# Patient Record
Sex: FEMALE | Race: ASIAN | NOT HISPANIC OR LATINO | URBAN - METROPOLITAN AREA
[De-identification: names, ages, dates, MRNs, and addresses within clinical notes are randomized per-mention and may not be internally consistent; named-entity substitution may affect disease eponyms.]

---

## 2018-08-01 ENCOUNTER — EMERGENCY (EMERGENCY)
Facility: HOSPITAL | Age: 53
LOS: 1 days | Discharge: HOME | End: 2018-08-01
Attending: EMERGENCY MEDICINE | Admitting: EMERGENCY MEDICINE
Payer: MEDICAID

## 2018-08-01 VITALS
SYSTOLIC BLOOD PRESSURE: 145 MMHG | OXYGEN SATURATION: 99 % | RESPIRATION RATE: 18 BRPM | DIASTOLIC BLOOD PRESSURE: 89 MMHG | HEART RATE: 81 BPM | TEMPERATURE: 98 F

## 2018-08-01 LAB
ALBUMIN SERPL ELPH-MCNC: 3.9 G/DL — SIGNIFICANT CHANGE UP (ref 3.3–5)
ALP SERPL-CCNC: 118 U/L — SIGNIFICANT CHANGE UP (ref 40–120)
ALT FLD-CCNC: 45 U/L — HIGH (ref 4–33)
AST SERPL-CCNC: 36 U/L — HIGH (ref 4–32)
BASOPHILS # BLD AUTO: 0.02 K/UL — SIGNIFICANT CHANGE UP (ref 0–0.2)
BASOPHILS NFR BLD AUTO: 0.2 % — SIGNIFICANT CHANGE UP (ref 0–2)
BILIRUB SERPL-MCNC: 0.3 MG/DL — SIGNIFICANT CHANGE UP (ref 0.2–1.2)
BUN SERPL-MCNC: 12 MG/DL — SIGNIFICANT CHANGE UP (ref 7–23)
CALCIUM SERPL-MCNC: 9.4 MG/DL — SIGNIFICANT CHANGE UP (ref 8.4–10.5)
CHLORIDE SERPL-SCNC: 97 MMOL/L — LOW (ref 98–107)
CO2 SERPL-SCNC: 23 MMOL/L — SIGNIFICANT CHANGE UP (ref 22–31)
CREAT SERPL-MCNC: 0.61 MG/DL — SIGNIFICANT CHANGE UP (ref 0.5–1.3)
D DIMER BLD IA.RAPID-MCNC: 163 NG/ML — SIGNIFICANT CHANGE UP
EOSINOPHIL # BLD AUTO: 0.22 K/UL — SIGNIFICANT CHANGE UP (ref 0–0.5)
EOSINOPHIL NFR BLD AUTO: 1.7 % — SIGNIFICANT CHANGE UP (ref 0–6)
GLUCOSE SERPL-MCNC: 233 MG/DL — HIGH (ref 70–99)
HCT VFR BLD CALC: 43.3 % — SIGNIFICANT CHANGE UP (ref 34.5–45)
HGB BLD-MCNC: 13.2 G/DL — SIGNIFICANT CHANGE UP (ref 11.5–15.5)
IMM GRANULOCYTES # BLD AUTO: 0.05 # — SIGNIFICANT CHANGE UP
IMM GRANULOCYTES NFR BLD AUTO: 0.4 % — SIGNIFICANT CHANGE UP (ref 0–1.5)
LIDOCAIN IGE QN: 33 U/L — SIGNIFICANT CHANGE UP (ref 7–60)
LYMPHOCYTES # BLD AUTO: 31 % — SIGNIFICANT CHANGE UP (ref 13–44)
LYMPHOCYTES # BLD AUTO: 4.01 K/UL — HIGH (ref 1–3.3)
MCHC RBC-ENTMCNC: 23.7 PG — LOW (ref 27–34)
MCHC RBC-ENTMCNC: 30.5 % — LOW (ref 32–36)
MCV RBC AUTO: 77.6 FL — LOW (ref 80–100)
MONOCYTES # BLD AUTO: 0.5 K/UL — SIGNIFICANT CHANGE UP (ref 0–0.9)
MONOCYTES NFR BLD AUTO: 3.9 % — SIGNIFICANT CHANGE UP (ref 2–14)
NEUTROPHILS # BLD AUTO: 8.15 K/UL — HIGH (ref 1.8–7.4)
NEUTROPHILS NFR BLD AUTO: 62.8 % — SIGNIFICANT CHANGE UP (ref 43–77)
NRBC # FLD: 0 — SIGNIFICANT CHANGE UP
PLATELET # BLD AUTO: 340 K/UL — SIGNIFICANT CHANGE UP (ref 150–400)
PMV BLD: 10.3 FL — SIGNIFICANT CHANGE UP (ref 7–13)
POTASSIUM SERPL-MCNC: 4.3 MMOL/L — SIGNIFICANT CHANGE UP (ref 3.5–5.3)
POTASSIUM SERPL-SCNC: 4.3 MMOL/L — SIGNIFICANT CHANGE UP (ref 3.5–5.3)
PROT SERPL-MCNC: 8 G/DL — SIGNIFICANT CHANGE UP (ref 6–8.3)
RBC # BLD: 5.58 M/UL — HIGH (ref 3.8–5.2)
RBC # FLD: 15.2 % — HIGH (ref 10.3–14.5)
SODIUM SERPL-SCNC: 137 MMOL/L — SIGNIFICANT CHANGE UP (ref 135–145)
TROPONIN T, HIGH SENSITIVITY: < 6 NG/L — SIGNIFICANT CHANGE UP (ref ?–14)
TROPONIN T, HIGH SENSITIVITY: < 6 NG/L — SIGNIFICANT CHANGE UP (ref ?–14)
WBC # BLD: 12.95 K/UL — HIGH (ref 3.8–10.5)
WBC # FLD AUTO: 12.95 K/UL — HIGH (ref 3.8–10.5)

## 2018-08-01 PROCEDURE — 93010 ELECTROCARDIOGRAM REPORT: CPT | Mod: 59

## 2018-08-01 PROCEDURE — 76705 ECHO EXAM OF ABDOMEN: CPT | Mod: 26

## 2018-08-01 PROCEDURE — 71046 X-RAY EXAM CHEST 2 VIEWS: CPT | Mod: 26

## 2018-08-01 PROCEDURE — 99220: CPT | Mod: 25

## 2018-08-01 RX ORDER — PANTOPRAZOLE SODIUM 20 MG/1
40 TABLET, DELAYED RELEASE ORAL ONCE
Qty: 0 | Refills: 0 | Status: COMPLETED | OUTPATIENT
Start: 2018-08-01 | End: 2018-08-01

## 2018-08-01 RX ORDER — KETOROLAC TROMETHAMINE 30 MG/ML
30 SYRINGE (ML) INJECTION ONCE
Qty: 0 | Refills: 0 | Status: DISCONTINUED | OUTPATIENT
Start: 2018-08-01 | End: 2018-08-01

## 2018-08-01 RX ORDER — METOCLOPRAMIDE HCL 10 MG
10 TABLET ORAL ONCE
Qty: 0 | Refills: 0 | Status: COMPLETED | OUTPATIENT
Start: 2018-08-01 | End: 2018-08-01

## 2018-08-01 RX ORDER — ASPIRIN/CALCIUM CARB/MAGNESIUM 324 MG
162 TABLET ORAL ONCE
Qty: 0 | Refills: 0 | Status: COMPLETED | OUTPATIENT
Start: 2018-08-01 | End: 2018-08-01

## 2018-08-01 RX ORDER — FAMOTIDINE 10 MG/ML
20 INJECTION INTRAVENOUS ONCE
Qty: 0 | Refills: 0 | Status: COMPLETED | OUTPATIENT
Start: 2018-08-01 | End: 2018-08-01

## 2018-08-01 RX ORDER — SODIUM CHLORIDE 9 MG/ML
1000 INJECTION INTRAMUSCULAR; INTRAVENOUS; SUBCUTANEOUS ONCE
Qty: 0 | Refills: 0 | Status: COMPLETED | OUTPATIENT
Start: 2018-08-01 | End: 2018-08-01

## 2018-08-01 RX ADMIN — SODIUM CHLORIDE 1000 MILLILITER(S): 9 INJECTION INTRAMUSCULAR; INTRAVENOUS; SUBCUTANEOUS at 17:17

## 2018-08-01 RX ADMIN — Medication 10 MILLIGRAM(S): at 17:17

## 2018-08-01 RX ADMIN — Medication 162 MILLIGRAM(S): at 16:48

## 2018-08-01 RX ADMIN — PANTOPRAZOLE SODIUM 40 MILLIGRAM(S): 20 TABLET, DELAYED RELEASE ORAL at 21:42

## 2018-08-01 RX ADMIN — Medication 30 MILLIGRAM(S): at 23:00

## 2018-08-01 RX ADMIN — FAMOTIDINE 20 MILLIGRAM(S): 10 INJECTION INTRAVENOUS at 16:48

## 2018-08-01 RX ADMIN — Medication 30 MILLIGRAM(S): at 22:28

## 2018-08-01 RX ADMIN — Medication 30 MILLILITER(S): at 16:48

## 2018-08-01 RX ADMIN — SODIUM CHLORIDE 1000 MILLILITER(S): 9 INJECTION INTRAMUSCULAR; INTRAVENOUS; SUBCUTANEOUS at 19:22

## 2018-08-01 NOTE — ED PROVIDER NOTE - ATTENDING CONTRIBUTION TO CARE
54 yo F presents with 2 weeks of epigastric pain/substernal pain. nonradiating, sharp burning pain, rated 7/10 at this time, present all day, and worse in the evening and in the middle of the night. reports exertional worsening of this pain, a/w exertional sob. no relation to food intake, doesn't know if food makes it better or worse. + chills + dry cough. + nausea, 1 episode of vomiting yesterday.  no fevers, uri symptoms, diarrhea, melena, BRBPR, urinary complaints.   PE nontoxic. lungs CTA. epigastric/sternal TTP, worse with inspiration. no other abdominal TTP.   in the ER pt given IVFs, pepcid, protonix, maalox, and aspirin. 54 yo F presents with 2 weeks of epigastric pain/substernal pain. nonradiating, sharp burning pain, rated 7/10 at this time, present all day, and worse in the evening and in the middle of the night. reports exertional worsening of this pain, a/w exertional sob. no relation to food intake, doesn't know if food makes it better or worse. + chills + dry cough. + nausea, 1 episode of vomiting yesterday.  no fevers, uri symptoms, diarrhea, melena, BRBPR, urinary complaints.   PE nontoxic. lungs CTA. epigastric/sternal TTP, worse with inspiration. no other abdominal TTP.   in the ER pt given IVFs, pepcid, protonix, maalox, and aspirin.    ed workup 52 yo F presents with 2 weeks of epigastric pain/substernal pain. nonradiating, sharp burning pain, rated 7/10 at this time, present all day, and worse in the evening and in the middle of the night. reports exertional worsening of this pain, a/w exertional sob. no relation to food intake, doesn't know if food makes it better or worse. + chills + dry cough. + nausea, 1 episode of vomiting yesterday.  no fevers, uri symptoms, diarrhea, melena, BRBPR, urinary complaints.   PE nontoxic. lungs CTA. epigastric/sternal TTP, worse with inspiration. no other abdominal TTP.   ed workup with US showing cholelithiasis without cholecystitis. CXR with NAD. dimer negative and pt low risk for PE. trop negative. EKG with no STEMI.   in the ER pt given IVFs, pepcid, protonix, maalox, and aspirin. moderate improvement of pain. gi versus cardiac. heart score 4, moderate risk for ACS, will admit pt to CDU for serial trops and provocative testing.

## 2018-08-01 NOTE — ED ADULT NURSE NOTE - OBJECTIVE STATEMENT
Intake RN: Patient received in intake bed 10b, patient is a 54 y/o female a&ox4 p/w a c/c of epigastric pain x2 weeks.  Patient reported the pain is intermittent, denies palliating or provoking factors, denies radiation of pain.  Patient also endorsing exertional dyspnea, as well as HA.  Denies SOB while at rest, able to speak comfortably in full sentences.  20 gauge PIV placed in right ac, will continue to monitor.

## 2018-08-01 NOTE — ED PROVIDER NOTE - MEDICAL DECISION MAKING DETAILS
53 year old female with a PMHx of DMII, HTN, HLD, GERD pw constant worsening epigastric/midsternal "squeezing" for 2 weeks.   Plan: r/o ACS

## 2018-08-01 NOTE — ED CDU PROVIDER INITIAL DAY NOTE - MUSCULOSKELETAL, MLM
Spine appears normal, range of motion is not limited, pt with subjectively reproducible lower sternal TTP.  Thorax with no dermatitis / skin lesion(s).

## 2018-08-01 NOTE — ED ADULT TRIAGE NOTE - CHIEF COMPLAINT QUOTE
Co epigastric pain x 2 weeks. Co nausea and vomited twice last night. Also co pain to head, left side, and right shoulder x "few months". Co tingling in hands x 2 months. PMH: diabetes, htn, high cholesterol

## 2018-08-01 NOTE — ED CDU PROVIDER INITIAL DAY NOTE - OBJECTIVE STATEMENT
53 year old female with a PMHx of DMII, HTN, HLD, GERD pw constant worsening epigastric/midsternal "squeezing" for 2 weeks. Pain is 10/10, nonradiating, worse when laying flat, associated with 2 episodes of NB/NB yesterday. Pt also endorsing GUEVARA when climbing stairs and lightheadedness for several months. No previous stress/cards w/u - no cardiologist. No family hx of cardiac disease. Denies f/c, cough, calf swelling, hx of travel/PE/DVT, diarrhea, constipation, melena, hematochezia.    CDU JOCELYN [place] Note-----  HPI as above; pt was evaluated in the ED; trop x 2 were each <6, EKG was NSR with no pattern of acute ischemia noted.  Pt had RUQ US which showed cholelithiasis without sonographic evidence of cholecystitis.  Pt. was sent to CDU for plan: Cardiac tele monitoring, stress test, general observation / reassessment.  On CDU arrival, pt c/o reproducible (to palpation and movement) pain to anterior chest lower sternal region; states c/w prior symptoms.  No hx/o vomiting, nausea, diarrhea, fevers, chills, SOB at rest, or other c/o.  CDU plan d/w pt who verbalizes agreement with plan.

## 2018-08-01 NOTE — ED PROVIDER NOTE - OBJECTIVE STATEMENT
53 year old female with a PMHx of DMII, HTN, HLD, GERD pw constant worsening epigastric/midsternal "squeezing" for 2 weeks. Pain is 10/10, nonradiating, worse when laying flat, associated with abdominal distention and 2 episodes of NB/NB yesterday. Pt also endorsing GUEVARA when climbing stairs and lightheadedness for several months. No previous stress/cards w/u - no cardiologist. No family hx of cardiac disease. Denies f/c, cough, calf swelling, hx of travel/PE/DVT, diarrhea, constipation, melena, hematochezia. 53 year old female with a PMHx of DMII, HTN, HLD, GERD pw constant worsening epigastric/midsternal "squeezing" for 2 weeks. Pain is 10/10, nonradiating, worse when laying flat, associated with 2 episodes of NB/NB yesterday. Pt also endorsing GUEVARA when climbing stairs and lightheadedness for several months. No previous stress/cards w/u - no cardiologist. No family hx of cardiac disease. Denies f/c, cough, calf swelling, hx of travel/PE/DVT, diarrhea, constipation, melena, hematochezia.

## 2018-08-01 NOTE — ED CDU PROVIDER INITIAL DAY NOTE - INDICATION FOR OBSERVATION
Other/Diagnostic Uncertainty/Cardiac tele monitoring, stress test, general observation / reassessment.

## 2018-08-01 NOTE — ED CDU PROVIDER INITIAL DAY NOTE - ATTENDING CONTRIBUTION TO CARE
52 yo F presents with 2 weeks of epigastric pain/substernal pain. nonradiating, sharp burning pain, rated 7/10 at this time, present all day, and worse in the evening and in the middle of the night. reports exertional worsening of this pain, a/w exertional sob. no relation to food intake, doesn't know if food makes it better or worse. + chills + dry cough. + nausea, 1 episode of vomiting yesterday.  no fevers, uri symptoms, diarrhea, melena, BRBPR, urinary complaints.   PE nontoxic. lungs CTA. epigastric/sternal TTP, worse with inspiration. no other abdominal TTP.   ed workup with US showing cholelithiasis without cholecystitis. CXR with NAD. dimer negative and pt low risk for PE. trop negative. EKG with no STEMI.   in the ER pt given IVFs, pepcid, protonix, maalox, and aspirin. mild improvement of pain. gi versus cardiac. heart score 4, moderate risk for ACS, pt admitted to CDU for serial trops and provocative testing.

## 2018-08-02 VITALS
RESPIRATION RATE: 16 BRPM | SYSTOLIC BLOOD PRESSURE: 124 MMHG | HEART RATE: 76 BPM | DIASTOLIC BLOOD PRESSURE: 75 MMHG | OXYGEN SATURATION: 98 % | TEMPERATURE: 98 F

## 2018-08-02 PROCEDURE — 74174 CTA ABD&PLVS W/CONTRAST: CPT | Mod: 26

## 2018-08-02 PROCEDURE — 93016 CV STRESS TEST SUPVJ ONLY: CPT | Mod: GC

## 2018-08-02 PROCEDURE — 99217: CPT

## 2018-08-02 PROCEDURE — 78452 HT MUSCLE IMAGE SPECT MULT: CPT | Mod: 26

## 2018-08-02 PROCEDURE — 71275 CT ANGIOGRAPHY CHEST: CPT | Mod: 26

## 2018-08-02 PROCEDURE — 93018 CV STRESS TEST I&R ONLY: CPT | Mod: GC

## 2018-08-02 RX ORDER — OXYCODONE AND ACETAMINOPHEN 5; 325 MG/1; MG/1
1 TABLET ORAL ONCE
Qty: 0 | Refills: 0 | Status: DISCONTINUED | OUTPATIENT
Start: 2018-08-02 | End: 2018-08-02

## 2018-08-02 RX ORDER — FAMOTIDINE 10 MG/ML
20 INJECTION INTRAVENOUS EVERY 12 HOURS
Qty: 0 | Refills: 0 | Status: DISCONTINUED | OUTPATIENT
Start: 2018-08-02 | End: 2018-08-05

## 2018-08-02 RX ADMIN — OXYCODONE AND ACETAMINOPHEN 1 TABLET(S): 5; 325 TABLET ORAL at 06:05

## 2018-08-02 RX ADMIN — OXYCODONE AND ACETAMINOPHEN 1 TABLET(S): 5; 325 TABLET ORAL at 07:05

## 2018-08-02 RX ADMIN — FAMOTIDINE 20 MILLIGRAM(S): 10 INJECTION INTRAVENOUS at 06:46

## 2018-08-02 RX ADMIN — Medication 30 MILLILITER(S): at 06:46

## 2018-08-02 NOTE — ED CDU PROVIDER DISPOSITION NOTE - CLINICAL COURSE
Cabot: 53F with PMH of DMII, HTN, HLD, GERD pw constant worsening epigastric/midsternal "squeezing" for 2 weeks. EKG unconcerning, trop < 6 x2, dimer neg, CTA C/A/P neg for dissection.  Stress test was normal.  On exam, HDS, NAD, lungs CTAB, heart sounds normal, abd benign, LEs without edema. Safe for discharge.

## 2018-08-02 NOTE — ED CDU PROVIDER SUBSEQUENT DAY NOTE - ATTENDING CONTRIBUTION TO CARE
I, Jennifer Cabot, MD, have performed a history and physical exam of the patient and discussed their management with the ACP.  I reviewed the PA's note and agree with the documented findings and plan of care. My medical decision making and observations are found above.    Cabot: Pt signed out to me.  53F with PMH of DMII, HTN, HLD, GERD pw constant worsening epigastric/midsternal "squeezing" for 2 weeks. EKG unconcerning, trop < 6 x2, dimer neg, CTA C/A/P neg for dissection.  Awaiting stress test.  On exam, HDS, NAD, lungs CTAB, heart sounds normal, abd benign, LEs without edema.

## 2018-08-02 NOTE — ED CDU PROVIDER SUBSEQUENT DAY NOTE - HISTORY
54 y/o female pmh htn, hld, DM, gerd c/o epigastric/chest pain x2 weeks. Pt admits to intermittent "squeezing" pain, worse with lying flat. Chest pain is non exertional , non radiating. Pt also admits to 2 episodes of emesis, nbnb. Pt also admits to GUEVARA after walking up 1-2 flights of stairs. Denies palpitations, diaphoresis, numbness, tingling, weakness, syncope, fever or chills.

## 2018-08-02 NOTE — ED CDU PROVIDER SUBSEQUENT DAY NOTE - PROGRESS NOTE DETAILS
Pt c/o recurring chest pain c/w prior; previously alleviated with Toradol.  In interim, pt had been objectively noted to be sleeping comfortably with no issues or c/o until awoke to use restroom and stated having lower sternal discomfort with some associated back (thoracic) discomfort (states c/w prior).  Due to nature of symptoms, CT angio chest / abdomen will be performed; repeat EKG was NSR and c/w prior.  Will continue to monitor. CT angio chest / abdomen with no acute findings; pt objectively noted to be resting comfortably in the interim.  Pt. will be signed out to the day CDU PA and attending at 0700 hrs. Cabot: Pt signed out to me.  53F with PMH of DMII, HTN, HLD, GERD pw constant worsening epigastric/midsternal "squeezing" for 2 weeks. EKG unconcerning, trop < 6 x2, dimer neg, CTA C/A/P neg for dissection.  Awaiting stress test.  On exam, HDS, NAD, lungs CTAB, heart sounds normal, abd benign, LEs without edema.

## 2018-08-02 NOTE — ED CDU PROVIDER DISPOSITION NOTE - ATTENDING CONTRIBUTION TO CARE
I, Jennifer Cabot, MD, have performed a history and physical exam of the patient and discussed their management with the ACP.  I reviewed the PA's note and agree with the documented findings and plan of care. My medical decision making and observations are found above.    Cabot: 53F with PMH of DMII, HTN, HLD, GERD pw constant worsening epigastric/midsternal "squeezing" for 2 weeks. EKG unconcerning, trop < 6 x2, dimer neg, CTA C/A/P neg for dissection.  Stress test was normal.  On exam, HDS, NAD, lungs CTAB, heart sounds normal, abd benign, LEs without edema. Safe for discharge.

## 2018-08-02 NOTE — ED CDU PROVIDER SUBSEQUENT DAY NOTE - MEDICAL DECISION MAKING DETAILS
Cabot: Pt signed out to me.  53F with PMH of DMII, HTN, HLD, GERD pw constant worsening epigastric/midsternal "squeezing" for 2 weeks. EKG unconcerning, trop < 6 x2, dimer neg, CTA C/A/P neg for dissection.  Awaiting stress test.  On exam, HDS, NAD, lungs CTAB, heart sounds normal, abd benign, LEs without edema.

## 2024-05-01 NOTE — ED CDU PROVIDER INITIAL DAY NOTE - CONSTITUTIONAL, MLM
Current blood pressure is slightly above normal.  She will go home and start taking it and sending me the information.  I want to get more data on her hemoglobin A1c and microalbumin as well as her chemistries to see if I can switch her blood pressure medication and possibly add an ARB and decrease her amlodipine.  If she is unable to tolerate an ARB she might do better on labetalol.   normal... Well appearing, well nourished, awake, alert, oriented to person, place, time/situation and in no apparent distress.  Pt. verbalizing in full, clear, effortless sentences.